# Patient Record
Sex: MALE | Race: WHITE | Employment: FULL TIME | URBAN - METROPOLITAN AREA
[De-identification: names, ages, dates, MRNs, and addresses within clinical notes are randomized per-mention and may not be internally consistent; named-entity substitution may affect disease eponyms.]

---

## 2017-01-22 ENCOUNTER — APPOINTMENT (OUTPATIENT)
Dept: GENERAL RADIOLOGY | Facility: HOSPITAL | Age: 22
End: 2017-01-22
Attending: EMERGENCY MEDICINE
Payer: COMMERCIAL

## 2017-01-22 ENCOUNTER — HOSPITAL ENCOUNTER (EMERGENCY)
Facility: HOSPITAL | Age: 22
Discharge: HOME | End: 2017-01-22
Attending: EMERGENCY MEDICINE
Payer: COMMERCIAL

## 2017-01-22 VITALS
RESPIRATION RATE: 18 BRPM | SYSTOLIC BLOOD PRESSURE: 134 MMHG | HEART RATE: 75 BPM | DIASTOLIC BLOOD PRESSURE: 88 MMHG | TEMPERATURE: 98 F | OXYGEN SATURATION: 100 % | BODY MASS INDEX: 22 KG/M2 | WEIGHT: 155 LBS

## 2017-01-22 DIAGNOSIS — S62.663A CLOSED NONDISPLACED FRACTURE OF DISTAL PHALANX OF LEFT MIDDLE FINGER, INITIAL ENCOUNTER: Primary | ICD-10-CM

## 2017-01-22 PROCEDURE — 11740 EVACUATION SUBUNGUAL HMTMA: CPT

## 2017-01-22 PROCEDURE — 26750 TREAT FINGER FRACTURE EACH: CPT

## 2017-01-22 PROCEDURE — 99283 EMERGENCY DEPT VISIT LOW MDM: CPT

## 2017-01-22 PROCEDURE — 73130 X-RAY EXAM OF HAND: CPT

## 2017-01-22 RX ORDER — CEPHALEXIN 500 MG/1
500 CAPSULE ORAL 4 TIMES DAILY
Qty: 40 CAPSULE | Refills: 0 | Status: SHIPPED | OUTPATIENT
Start: 2017-01-22 | End: 2017-02-01

## 2017-01-22 RX ORDER — HYDROCODONE BITARTRATE AND ACETAMINOPHEN 5; 325 MG/1; MG/1
1-2 TABLET ORAL EVERY 4 HOURS PRN
Qty: 20 TABLET | Refills: 0 | Status: SHIPPED | OUTPATIENT
Start: 2017-01-22 | End: 2017-01-29

## 2017-01-22 NOTE — ED INITIAL ASSESSMENT (HPI)
Patient arrives from home with c/o pain to 3rd, 4th finger and forearm accidentally hit with a sledge hammer approximately 1800 yesterday

## 2017-01-22 NOTE — ED PROVIDER NOTES
Patient Seen in: [facility] Emergency Department    History   Patient presents with:  Upper Extremity Injury (musculoskeletal)    Stated Complaint: Finger injury    HPI    Patient is a 17-year-old male who accidentally hit his left hand sledgehammer Vitals   BP 01/22/17 0222 136/90 mmHg   Pulse 01/22/17 0222 70   Resp 01/22/17 0222 20   Temp 01/22/17 0222 98 °F (36.7 °C)   Temp src 01/22/17 0222 Temporal   SpO2 01/22/17 0222 100 %   O2 Device 01/22/17 0222 None (Room air)       Current:/90 mmHg MD  [address] [address] [phone]    In 3 days  Return to the ER if you feel worse in any way, Return to the ER if you have any concerns      Medications Prescribed:  Current Discharge Medication List    START taking th

## 2018-10-11 PROBLEM — R09.81 NASAL CONGESTION: Status: ACTIVE | Noted: 2018-10-11

## 2019-05-23 PROBLEM — H93.299 ABNORMAL AUDITORY PERCEPTION, UNSPECIFIED LATERALITY: Status: ACTIVE | Noted: 2019-05-23

## 2019-07-31 ENCOUNTER — WALK IN (OUTPATIENT)
Dept: URGENT CARE | Age: 24
End: 2019-07-31

## 2019-07-31 DIAGNOSIS — Z23 NEED FOR DIPHTHERIA-TETANUS-PERTUSSIS (TDAP) VACCINE: ICD-10-CM

## 2019-07-31 DIAGNOSIS — Z23 NEED FOR MMR VACCINE: Primary | ICD-10-CM

## 2019-07-31 PROCEDURE — 90707 MMR VACCINE SC: CPT | Performed by: NURSE PRACTITIONER

## 2019-07-31 PROCEDURE — 90471 IMMUNIZATION ADMIN: CPT | Performed by: NURSE PRACTITIONER

## 2019-07-31 PROCEDURE — 90715 TDAP VACCINE 7 YRS/> IM: CPT | Performed by: NURSE PRACTITIONER

## 2019-07-31 PROCEDURE — 90472 IMMUNIZATION ADMIN EACH ADD: CPT | Performed by: NURSE PRACTITIONER

## 2019-10-11 ENCOUNTER — WALK IN (OUTPATIENT)
Dept: URGENT CARE | Age: 24
End: 2019-10-11

## 2019-10-11 DIAGNOSIS — Z23 NEED FOR IMMUNIZATION AGAINST INFLUENZA: Primary | ICD-10-CM

## 2019-10-11 PROCEDURE — 90686 IIV4 VACC NO PRSV 0.5 ML IM: CPT | Performed by: NURSE PRACTITIONER

## 2019-10-11 PROCEDURE — 90471 IMMUNIZATION ADMIN: CPT | Performed by: NURSE PRACTITIONER

## 2021-07-23 PROBLEM — H69.83 EUSTACHIAN TUBE DYSFUNCTION, BILATERAL: Status: ACTIVE | Noted: 2021-07-23

## 2021-07-23 PROBLEM — H69.93 EUSTACHIAN TUBE DYSFUNCTION, BILATERAL: Status: ACTIVE | Noted: 2021-07-23

## 2025-04-24 ENCOUNTER — TELEPHONE (OUTPATIENT)
Dept: ORTHOPEDICS CLINIC | Facility: CLINIC | Age: 30
End: 2025-04-24

## 2025-04-24 DIAGNOSIS — M25.522 LEFT ELBOW PAIN: Primary | ICD-10-CM

## 2025-04-24 NOTE — TELEPHONE ENCOUNTER
Patient called to schedule with Sincer for the left elbow. Patient stated he has been going to PT and they suggested for him to see Sincer and get xrays. Patient knows to arrive early. Please advise for xray   Future Appointments   Date Time Provider Department Center   4/29/2025  2:40 PM Hood Carney [id number]

## 2025-04-28 ENCOUNTER — OFFICE VISIT (OUTPATIENT)
Dept: ORTHOPEDICS CLINIC | Facility: CLINIC | Age: 30
End: 2025-04-28
Payer: COMMERCIAL

## 2025-04-28 ENCOUNTER — HOSPITAL ENCOUNTER (OUTPATIENT)
Dept: GENERAL RADIOLOGY | Age: 30
Discharge: HOME | End: 2025-04-28
Attending: PHYSICIAN ASSISTANT
Payer: COMMERCIAL

## 2025-04-28 VITALS — WEIGHT: 185 LBS | HEIGHT: 71 IN | BODY MASS INDEX: 25.9 KG/M2

## 2025-04-28 DIAGNOSIS — M25.522 LEFT ELBOW PAIN: ICD-10-CM

## 2025-04-28 DIAGNOSIS — M77.12 LATERAL EPICONDYLITIS OF LEFT ELBOW: Primary | ICD-10-CM

## 2025-04-28 PROCEDURE — 99203 OFFICE O/P NEW LOW 30 MIN: CPT | Performed by: PHYSICIAN ASSISTANT

## 2025-04-28 PROCEDURE — 3008F BODY MASS INDEX DOCD: CPT | Performed by: PHYSICIAN ASSISTANT

## 2025-04-28 PROCEDURE — 73080 X-RAY EXAM OF ELBOW: CPT | Performed by: PHYSICIAN ASSISTANT

## 2025-04-28 RX ORDER — MELOXICAM 15 MG/1
15 TABLET ORAL DAILY
Qty: 30 TABLET | Refills: 0 | Status: SHIPPED | OUTPATIENT
Start: 2025-04-28 | End: 2025-05-28

## 2025-04-28 RX ORDER — METHYLPREDNISOLONE 4 MG/1
TABLET ORAL
Qty: 1 EACH | Refills: 0 | Status: SHIPPED | OUTPATIENT
Start: 2025-04-28

## 2025-04-28 NOTE — H&P
[facility] - ORTHOPEDICS  [address] [place] [address]  [phone]     NEW PATIENT VISIT - HISTORY AND PHYSICAL EXAMINATION     Name: Ashish Velasquez   MRN: [id number]  Date: 2025     CC: Left  elbow pain.    REFERRED BY: KARINA DAUGHERTY    HPI:   Ashish Velasquez is a very pleasant 29 year old right-hand dominant male who presents today for evaluation, consultation, and management of left elbow pain and discomfort with radiation to the left hand/and shoulder.  The patient describes swelling, stiffness, weakness and 3 out of 10 pain.  The pain has been ongoing since 2025 without a clear mechanism of injury. She was referred from [facility].     PMH:   Past Medical History[1]    PAST SURGICAL HX:  Past Surgical History[2]    FAMILY HX:  Family History[3]    ALLERGIES:  Patient has no known allergies.    MEDICATIONS: Current Medications[4]    ROS: A complete 10 point review of systems performed and negative aside from the aforementioned per history of present illness.     SOCIAL HX:  Social History     Occupational History    Not on file   Tobacco Use    Smoking status: Former     Current packs/day: 0.00     Average packs/day: 0.2 packs/day for 2.0 years (0.4 ttl pk-yrs)     Types: Cigarettes     Start date: 2011     Quit date: 2013     Years since quittin.4    Smokeless tobacco: Never    Tobacco comments:     on and off 1 pk per week   Substance and Sexual Activity    Alcohol use: Yes     Alcohol/week: 3.3 standard drinks of alcohol     Types: 2 Glasses of wine, 2 Cans of beer per week     Comment: occasional beer/wine    Drug use: No    Sexual activity: Not on file         PE:   Vitals:    25 1053   Weight: 185 lb (83.9 kg)   Height: 5' 11\" (1.803 m)     Estimated body mass index is 25.8 kg/m² as calculated from the following:    Height as of this encounter: 5' 11\" (1.803 m).    Weight as of this encounter: 185 lb (83.9  kg).    Physical Exam  Constitutional:       Appearance: Normal appearance.   HENT:      Head: Normocephalic and atraumatic.   Eyes:      Extraocular Movements: Extraocular movements intact.   Neck:      Musculoskeletal: Normal range of motion and neck supple.   Cardiovascular:      Pulses: Normal pulses.   Pulmonary:      Effort: Pulmonary effort is normal. No respiratory distress.   Abdominal:      General: There is no distension.   Skin:     General: Skin is warm.      Capillary Refill: Capillary refill takes less than 2 seconds.      Findings: No bruising.   Neurological:      General: No focal deficit present.      Mental Status: Alert.   Psychiatric:         Mood and Affect: Mood normal.     Examination of the left elbow  demonstrates:     Skin is intact, warm and dry.     Deformity:   none  Atrophy:   none      Palpation:     Medial Epicondyle Negative  Lateral Epicondyle Positive  Olecranon   Negative    ROM:   Flexion   full and symmetric  Extension  full and symmetric  Pronation  full and symmetric    Strength:   Supraspinatus:   5/5  Subscapularis:   5/5  Infraspinatus/Teres: 5/5    Provocative Tests:   Moving Valgus Stress Test:  Negative  Biceps Hook Test:   Negative  Tinel's overlying Ulnar Nerve Negative    Neurovascular Upper Extremity (Bilateral)  Motor:    5/5 EPL, Finger Abduction, , Pinch, Deltoid  Sensation:   intact to light touch median, ulnar, radial and axillary nerve  Circulation:   Normal, 2+ radial pulse    The contralateral upper extremity is without limitation in range of motion or strength, no positive provocative maneuvers.     Radiographic Examination/Diagnostics:    I personally viewed, independently interpreted and radiology report was reviewed.    X-rays Left Elbow, 4/28/2024- no evidence of fracture, foreign body or soft tissue injury.     IMPRESSION: Ashish Velasquez is a 29 year old Right hand dominant male left lateral epicondylitis.     PLAN:   We had a detailed  discussion outlining the etiology, anatomy, pathophysiology, and natural history of the patient's findings. Imaging was reviewed in detail and correlated to a 3-dimensional model of the patient's pathology.     We reviewed the treatment of this disease condition.  Fortunately, treatment is amenable to conservative treatment which we chose to optimize at today's visit.  We recommend steroid pack, and meloxicam 15mg. We recommended physical therapy to aid in strengthening, range of motion, functional improvement, and return to baseline activity.  If not improved in four weeks - we will consider The patient had the opportunity to ask questions and all questions were answered appropriately.      FOLLOW-UP:   4 weeks, if not improved steroid injection.           Chasidyr LOLI Carney, [facility], PA-C Orthopedic Surgery / Sports Medicine Specialist  [facility] Orthopaedic Surgery  [address] [place] [address] [id number] [place] [email or web address]  [email or web address]  t: [phone]  o: [phone]  f: [phone]    This note was dictated using Dragon software.  While it was briefly proofread prior to completion, some grammatical, spelling, and word choice errors due to dictation may still occur.           [1]   Past Medical History:   Hearing impairment    conductive hearing loss    PONV (postoperative nausea and vomiting)   [2]   Past Surgical History:  Procedure Laterality Date    Other      orthoscopic bilateral knees 2011 and right knee arthroscopy 2014    Other      ear tubes    Other Right 2011    right knee arthrotomy    Tympanoplas/mastoidectomy Right 12/23/2014   [3]   Family History  Problem Relation Age of Onset    Ear Problems Neg     Allergies Neg     Cancer Neg     Bleeding Disorders Neg     Clotting Disorder Neg     Thyroid disease Neg     Hypertension Neg     Asthma Neg     Arthritis Neg     Diabetes Neg     Anesthesia Problems  Neg    [4]   Current Outpatient Medications   Medication Sig Dispense Refill     methylPREDNISolone (MEDROL) 4 MG Oral Tablet Therapy Pack As directed. 1 each 0    Meloxicam 15 MG Oral Tab Take 1 tablet (15 mg total) by mouth daily. Take 1 tablet by mouth daily for 30 days with food. 30 tablet 0    FLUTICASONE PROPIONATE 50 MCG/ACT Nasal Suspension SHAKE LIQUID AND USE 2 SPRAYS IN EACH NOSTRIL DAILY 16 g 11

## 2025-05-24 DIAGNOSIS — M77.12 LATERAL EPICONDYLITIS OF LEFT ELBOW: ICD-10-CM

## 2025-05-27 RX ORDER — MELOXICAM 15 MG/1
15 TABLET ORAL
Qty: 30 TABLET | Refills: 0 | Status: SHIPPED | OUTPATIENT
Start: 2025-05-27

## 2025-05-27 NOTE — TELEPHONE ENCOUNTER
Meloxicam 15 MG Oral Tab      Medication Quantity Refills Start End   Meloxicam 15 MG Oral Tab 30 tablet 0 4/28/2025 5/28/2025   Sig:   Take 1 tablet (15 mg total) by mouth daily. Take 1 tablet by mouth daily for 30 days with food.     Route:   Oral     Order #:   [id number]         DOS:N/A  Last OV: 04/28/2025  Last refill date: 04/28/2025     #/refills: 30/0  Upcoming appt:   Future Appointments   Date Time Provider Department Center   6/4/2025  7:10 AM [id number]     No recent labs~

## 2025-06-04 ENCOUNTER — OFFICE VISIT (OUTPATIENT)
Dept: ORTHOPEDICS CLINIC | Facility: CLINIC | Age: 30
End: 2025-06-04
Payer: COMMERCIAL

## 2025-06-04 DIAGNOSIS — M24.822 ELBOW LOCKING, LEFT: ICD-10-CM

## 2025-06-04 DIAGNOSIS — M77.12 LATERAL EPICONDYLITIS OF LEFT ELBOW: Primary | ICD-10-CM

## 2025-06-04 PROCEDURE — 99214 OFFICE O/P EST MOD 30 MIN: CPT | Performed by: PHYSICIAN ASSISTANT

## 2025-06-04 NOTE — PROGRESS NOTES
[facility] - ORTHOPEDICS  [address] [place] [address]  [phone]       Name: Ashish Velasquez   MRN: [id number]  Date: 6/4/2025     REASON FOR VISIT: Follow up for left lateral epicondylitis left lateral epicondylitis.      INTERVAL HISTORY:  Ashish Velasquez is a 29 year old male who returns for evaluation of physical therapy, steroid pack and Meloxicam 15mg.  He has noted some improvement but describes some persistent soreness, and describes persistent pain and feels occasionally something is \"jamming\" his elbow 3-4 out of 10 pain.    ROS: ROS    PE:   There were no vitals filed for this visit.  Estimated body mass index is 25.8 kg/m² as calculated from the following:    Height as of 4/28/25: 5' 11\" (1.803 m).    Weight as of 4/28/25: 185 lb (83.9 kg).    Physical Exam  Constitutional:       Appearance: Normal appearance.   HENT:      Head: Normocephalic and atraumatic.   Eyes:      Extraocular Movements: Extraocular movements intact.   Neck:      Musculoskeletal: Normal range of motion and neck supple.   Cardiovascular:      Pulses: Normal pulses.   Pulmonary:      Effort: Pulmonary effort is normal. No respiratory distress.   Abdominal:      General: There is no distension.   Skin:     General: Skin is warm.      Capillary Refill: Capillary refill takes less than 2 seconds.      Findings: No bruising.   Neurological:      General: No focal deficit present.      Mental Status: She is alert.   Psychiatric:         Mood and Affect: Mood normal.       Examination of the left elbow demonstrates:     Skin is intact, warm and dry.   Cervical:  Full ROM  Spurling's  Negative    Deformity:   none  Atrophy:   none    Scapular winging: Negative    Palpation:     AC Joint  Negative  Biceps Tendon  Negative  Greater Tuberosity Negative  +ECRB tendon   +crepitus     ROM:   Forward Flexion:  full and symmetric  Abduction:   full and symmetric  External Rotation:  full and  symmetric  Internal Rotation:  full and symmetric    Rotator Cuff Strength:   Supraspinatus:   5/5  Subscapularis:   5/5  Infraspinatus/Teres: 5/5    Provocative Tests:   Diez:   Negative  Speed's:   Negative  [place]'s:   Negative  Lift-off:   Negative  Apprehension:  Negative  Sulcus Sign:   Negative    Neurovascular Upper Extremity (Bilateral)  Motor:    5/5 EPL, Finger Abduction, , Pinch, Deltoid  Sensation:   intact to light touch median, ulnar, radial and axillary nerve  Circulation:   Normal, 2+ radial pulse    The contralateral upper extremity is without limitation in range of motion or strength, no positive provocative maneuvers.     Radiographic Examination/Diagnostics:    I personally viewed, independently interpreted and radiology report was reviewed.    04/28/2025- Left Elbow:   Impression   CONCLUSION:  No abnormality demonstrated in the left elbow.       IMPRESSION: Ashish Velasquez is a 29 year old male who presented for follow up of left lateral epicondylitis, with concerns for internal deranagement.     PLAN:   We had a detailed discussion outlining the etiology, anatomy, pathophysiology, and natural history of the patient's findings.    We reviewed the treatment of this disease condition.  In light of the chronicity of symptoms, loss of normal function, and  failure to progress conservatively we recommend an MRI to evaluate the integrity of the patient's ECRB tendon and rule out internal deranagement. The patient will follow up after imaging.   Differential diagnosis includes but not limited to: cartilage injury/loose body.    External records were also reviewed for pertinent historical findings contributing to the patients undiagnosed new problem with uncertain prognosis.     The patient had the opportunity to ask questions, and all questions were answered appropriately.         FOLLOW-UP:  Return to clinic following completion of MRI to review scan and findings.             Hood ENNIS  [place]LAND[place] Carney, PAHaydenC Orthopedic Surgery / Sports Medicine Specialist  [facility]edic [facility]  [address] [place] [address]   [email or web address]  [email or web address]  t: [phone]  o: [phone]  f: [phone]    This note was dictated using Dragon software.  While it was briefly proofread prior to completion, some grammatical, spelling, and word choice errors due to dictation may still occur.

## 2025-06-09 ENCOUNTER — HOSPITAL ENCOUNTER (OUTPATIENT)
Dept: MRI IMAGING | Facility: HOSPITAL | Age: 30
Discharge: HOME | End: 2025-06-09
Attending: PHYSICIAN ASSISTANT
Payer: COMMERCIAL

## 2025-06-09 ENCOUNTER — HOSPITAL ENCOUNTER (OUTPATIENT)
Dept: GENERAL RADIOLOGY | Facility: HOSPITAL | Age: 30
Discharge: HOME | End: 2025-06-09
Attending: PHYSICIAN ASSISTANT
Payer: COMMERCIAL

## 2025-06-09 DIAGNOSIS — M24.822 ELBOW LOCKING, LEFT: ICD-10-CM

## 2025-06-09 DIAGNOSIS — M77.12 LATERAL EPICONDYLITIS OF LEFT ELBOW: ICD-10-CM

## 2025-06-09 PROCEDURE — 73221 MRI JOINT UPR EXTREM W/O DYE: CPT | Performed by: PHYSICIAN ASSISTANT

## 2025-06-09 PROCEDURE — 70140 X-RAY EXAM OF FACIAL BONES: CPT | Performed by: PHYSICIAN ASSISTANT

## 2025-06-24 ENCOUNTER — OFFICE VISIT (OUTPATIENT)
Dept: ORTHOPEDICS CLINIC | Facility: CLINIC | Age: 30
End: 2025-06-24
Payer: COMMERCIAL

## 2025-06-24 DIAGNOSIS — M79.9 SOFT TISSUE LESION OF ELBOW REGION: Primary | ICD-10-CM

## 2025-06-24 DIAGNOSIS — M77.12 LATERAL EPICONDYLITIS OF LEFT ELBOW: ICD-10-CM

## 2025-06-24 PROCEDURE — 20606 DRAIN/INJ JOINT/BURSA W/US: CPT | Performed by: PHYSICIAN ASSISTANT

## 2025-06-24 PROCEDURE — 99213 OFFICE O/P EST LOW 20 MIN: CPT | Performed by: PHYSICIAN ASSISTANT

## 2025-06-24 RX ORDER — TRIAMCINOLONE ACETONIDE 40 MG/ML
40 INJECTION, SUSPENSION INTRA-ARTICULAR; INTRAMUSCULAR ONCE
Status: COMPLETED | OUTPATIENT
Start: 2025-06-24 | End: 2025-06-24

## 2025-06-24 RX ORDER — KETOROLAC TROMETHAMINE 30 MG/ML
30 INJECTION, SOLUTION INTRAMUSCULAR; INTRAVENOUS ONCE
Status: COMPLETED | OUTPATIENT
Start: 2025-06-24 | End: 2025-06-24

## 2025-06-24 RX ADMIN — KETOROLAC TROMETHAMINE 30 MG: 30 INJECTION, SOLUTION INTRAMUSCULAR; INTRAVENOUS at 07:08:00

## 2025-06-24 RX ADMIN — TRIAMCINOLONE ACETONIDE 40 MG: 40 INJECTION, SUSPENSION INTRA-ARTICULAR; INTRAMUSCULAR at 07:08:00

## 2025-06-24 NOTE — PROGRESS NOTES
[facility] - ORTHOPEDICS  [address] [place] [address]  [phone]       Name: Ashish Velasquez   MRN: [id number]  Date: 6/24/2025     REASON FOR VISIT: Follow up for  left lateral epicondylitis.      INTERVAL HISTORY:  Ashish Velasquez is a 29 year old male who returns for evaluation of  left lateral epicondylitis.  The patient presented for recurrent left lateral epicondylitis.  He has attempted physical therapy, steroid pack and meloxicam.  Recently he complained of something occasionally jamming his elbow and was sent for an MRI to evaluate for internal derangement.  He presents today for follow-up.    ROS: ROS    PE:   There were no vitals filed for this visit.  Estimated body mass index is 25.8 kg/m² as calculated from the following:    Height as of 4/28/25: 5' 11\" (1.803 m).    Weight as of 4/28/25: 185 lb (83.9 kg).    Physical Exam  Constitutional:       Appearance: Normal appearance.   HENT:      Head: Normocephalic and atraumatic.   Eyes:      Extraocular Movements: Extraocular movements intact.   Neck:      Musculoskeletal: Normal range of motion and neck supple.   Cardiovascular:      Pulses: Normal pulses.   Pulmonary:      Effort: Pulmonary effort is normal. No respiratory distress.   Abdominal:      General: There is no distension.   Skin:     General: Skin is warm.      Capillary Refill: Capillary refill takes less than 2 seconds.      Findings: No bruising.   Neurological:      General: No focal deficit present.      Mental Status: She is alert.   Psychiatric:         Mood and Affect: Mood normal.       Examination of the left elbow demonstrates:      Skin is intact, warm and dry.   Cervical:  Full ROM  Spurling's                           Negative     Deformity:                         none  Atrophy:                             none    Scapular winging:Negative     Palpation:                            AC Joint                              Negative  Biceps  Tendon                  Negative  Greater Tuberosity          Negative  +ECRB tendon   +crepitus      ROM:   Forward Flexion:              full and symmetric  Abduction:                         full and symmetric  External Rotation:           full and symmetric  Internal Rotation:            full and symmetric     Rotator Cuff Strength:   Supraspinatus:                  5/5  Subscapularis:                   5/5  Infraspinatus/Teres:        5/5     Provocative Tests:   Diez:                            Negative  Speed's:                             Negative  [place]'s:                           Negative  Lift-off:                  Negative  Apprehension:                  Negative  Sulcus Sign:                        Negative     Neurovascular Upper Extremity (Bilateral)  Motor:                                5/5 EPL, Finger Abduction, , Pinch, Deltoid  Sensation:                          intact to light touch median, ulnar, radial and axillary nerve  Circulation:                        Normal, 2+ radial pulse     The contralateral upper extremity is without limitation in range of motion or strength, no positive provocative maneuvers.     Radiographic Examination/Diagnostics:    I personally viewed, independently interpreted and radiology report was reviewed.    MRI ELBOW, LEFT (CPT=73221)  Result Date: 6/12/2025  PROCEDURE: MRI ELBOW, LEFT (CPT=73221)  COMPARISON: SHELBY Amanda ELBOW, COMPLETE (MIN 3 VIEWS), LEFT (CPT=73080), 4/28/2025, 10:42 AM.  INDICATIONS: M24.822 Elbow locking, left M77.12 Lateral epicondylitis of left elbow  TECHNIQUE: A complete multi-planar examination was performed without contrast.   FINDINGS:  TENDONS EXTENSORS: There is thickening with increased signal seen within the proximal aspect of the common extensor tendon at its origin.  There are small clefts of fluid undercutting the tendon bone interface at the level of the lateral epicondyle.  No full-thickness or retracted tear.  FLEXORS: Normal.  No tear, tendinopathy, or bone marrow edema at the origin or insertion.  OTHER: Normal biceps, brachialis, and triceps tendons.   LIGAMENTS RADIAL: Normal.  No tear of the radial collateral, lateral ulnar collateral, or annular ligaments.  ULNAR: Normal.  No tear or bone marrow edema adjacent to the origin or insertion.   MUSCLES: Within the distal musculotendinous junction of the brachialis, there is a multiloculated and septated lesion measuring 1 x 0.7 by 2.8 cm.  This lesion is hyperintense on the proton density fat suppressed sequences and is hyperintense to the adjacent skeletal muscle on the T1 weighted images.  Remainder of the muscles otherwise have a normal signal intensity and bulk. BONES: Normal.  No edema, AVN, fracture, or significant arthropathy.  EFFUSION: None.  No synovitis or loose bodies.  CUBITAL TUNNEL: Normal.  Normal caliber and signal intensity of the ulnar nerve.  OTHER: Negative.          CONCLUSION:   Tendinosis of the common extensor tendon with partial thickness tearing (lateral epicondylitis).  No full-thickness or retracted tear.  2.8 cm cystic lesion within the brachialis muscle is most suggestive of an intramuscular ganglion cyst.  However on the T1 weighted sequences, the lesion is slightly complex which maybe secondary to internal debris, blood byproducts or soft tissue. A post contrast MRI of the elbow or dedicated targeted ultrasound is recommended to exclude underlying enhancement/vascularity.    Dictated by (CST): Fredi Taylor MD on 6/12/2025 at 3:10 PM     Finalized by (CST): Fredi Taylor MD on 6/12/2025 at 3:37 PM          XR ORBITS SCREENING R/O FB MRI EM  Result Date: 6/9/2025  PROCEDURE: XR ORBITS SCREENING R/O FB MRI EM  COMPARISON: None.  INDICATIONS: Recent metallic injury to eye, evaluate for foreign body.  TECHNIQUE:   Sotelo view.   FINDINGS:  ORBITS: Negative for metallic foreign body. OTHER: Negative.         CONCLUSION: No radiodense foreign  body.   Dictated by (CST): Scot Santana MD on 6/09/2025 at 11:30 AM     Finalized by (CST): Scot Santana MD on 6/09/2025 at 11:32 AM            IMPRESSION: Ashish Velasquez is a 29 year old male who presented for follow up of left elbow lateral epicondylitis, partial tearing of the extensor tendon.    PLAN:   We had a detailed discussion outlining the etiology, anatomy, pathophysiology, and natural history of the patient's findings.    We reviewed the treatment of this disease condition.  Fortunately, treatment is amenable to conservative treatment which we chose to optimize at today's visit.     After a discussion of a variety of conservative treatment options we elected to proceed with the injection procedure at today's visit. We discussed the risk and benefits of the procedure, including, but not limited to: infection, injury to blood vessels, nerve injury, prolonged pain, swelling, site soreness, failure to progress, and need for advanced treatments.  The patient voiced understanding and agreed to proceed with the treatment plan.     His MRI findings did demonstrate a cystic lesion that radiographically was suggestive of an intramuscular ganglion cyst.  We will obtain a postcontrast study of the elbow to further evaluate this as recommended by radiology.     We recommended physical therapy to aid in strengthening, range of motion, functional improvement, and return to baseline activity.  The patient had opportunity to ask questions and all questions were answered appropriately.    FOLLOW-UP:  Return to clinic following completion of MRI to review scan and findings.             Hood Carney [facility], PA-C Orthopedic Surgery / Sports Medicine Specialist  [facility] Orthopaedic Surgery  [address] [place] [address] [id number] [place] [email or web address]  [email or web address]  t: [phone]  o: [phone]  f: [phone]    This note was dictated using Dragon software.  While it was briefly proofread prior to completion,  some grammatical, spelling, and word choice errors due to dictation may still occur.

## 2025-06-24 NOTE — PROCEDURES
Left Elbow Joint Injection    Name: Ashish Velasquez   MRN: [id number]  Date: 6/24/2025     Clinical Indications:   Lateral Epicondylitis    After informed consent, the injection site was marked, sterilized with topical chlorhexidine antiseptic, and locally anesthetized with skin refrigerant.    The patient was seated upright and the shoulder was exposed. Using sterile technique: 1 mL of 30mg/mL of Ketorolac, 1 mL of 0.5% Bupivicaine, 1 mL of 1% Lidocaine, and 1 mL of 40mg/mL Triamcinolone was injected with direct approach utilizing a 25 gauge needle.  A band-aid was applied.  The patient tolerated the procedure well.    Disposition:   Continue with physical/occupational therapy and follow up in clinic if symptoms do not resolve in 8 weeks.           Hood Carney [facility], PA-C Orthopedic Surgery / Sports Medicine Specialist  [facility]  [address] [place], [place] [email or web address]  [email or web address]  t: [phone]  o: [phone]  f: [phone]    This note was dictated using Dragon software.  While it was briefly proofread prior to completion, some grammatical, spelling, and word choice errors due to dictation may still occur.

## 2025-07-11 ENCOUNTER — HOSPITAL ENCOUNTER (OUTPATIENT)
Dept: MRI IMAGING | Facility: HOSPITAL | Age: 30
Discharge: HOME | End: 2025-07-11
Attending: PHYSICIAN ASSISTANT
Payer: COMMERCIAL

## 2025-07-11 DIAGNOSIS — M77.12 LATERAL EPICONDYLITIS OF LEFT ELBOW: ICD-10-CM

## 2025-07-11 DIAGNOSIS — M79.9 SOFT TISSUE LESION OF ELBOW REGION: ICD-10-CM

## 2025-07-11 PROCEDURE — 73223 MRI JOINT UPR EXTR W/O&W/DYE: CPT | Performed by: PHYSICIAN ASSISTANT

## 2025-07-11 PROCEDURE — A9575 INJ GADOTERATE MEGLUMI 0.1ML: HCPCS | Performed by: PHYSICIAN ASSISTANT

## 2025-07-11 RX ORDER — GADOTERATE MEGLUMINE 376.9 MG/ML
18 INJECTION INTRAVENOUS
Status: COMPLETED | OUTPATIENT
Start: 2025-07-11 | End: 2025-07-11

## 2025-07-11 RX ADMIN — GADOTERATE MEGLUMINE 18 ML: 376.9 INJECTION INTRAVENOUS at 21:30:00

## 2025-08-05 DIAGNOSIS — M77.12 LATERAL EPICONDYLITIS OF LEFT ELBOW: ICD-10-CM

## 2025-08-05 RX ORDER — MELOXICAM 15 MG/1
15 TABLET ORAL
Qty: 30 TABLET | Refills: 0 | Status: SHIPPED | OUTPATIENT
Start: 2025-08-05

## 2025-08-06 ENCOUNTER — TELEPHONE (OUTPATIENT)
Dept: ORTHOPEDICS CLINIC | Facility: CLINIC | Age: 30
End: 2025-08-06

## (undated) NOTE — ED AVS SNAPSHOT
[facility] Emergency Department    [facility]  [address] [address]    Phone:  [phone]    Fax:  [phone]           Mr. iKmberly Morales   MRN: [id number]    Department:  [facility] Emergency Department   Date of Visi IF THERE IS ANY CHANGE OR WORSENING OF YOUR CONDITION, CALL YOUR PRIMARY CARE PHYSICIAN AT ONCE OR RETURN IMMEDIATELY TO THE EMERGENCY DEPARTMENT.     If you have been prescribed any medication(s), please fill your prescription right away and begin taking t

## (undated) NOTE — ED AVS SNAPSHOT
[facility] Emergency Department    [facility]  [address] [address]    Phone:  [phone]    Fax:  [phone]            Hank Malone   MRN: [id number]    Department:  [facility] Emergency Department   Date of Visi coverage for follow-up care and referrals. [address] [facility] [place] [phone]  Pediatric 443 3314 Emergency Department   [phone]       To Check ER Wait Times:  TEXT 'Dorinda' to [phone]      Click [email or web address] will be contacted. Please make sure we have your correct phone number before you leave. After you leave, you should follow the attached instructions. I have read and understand the instructions given to me by my caregivers.         24-Hour Pharmacies XR HAND (MIN 3 VIEWS), LEFT (CPT=73130) (In process)       RETChart     Sign up for [employer], your secure online medical record. RETChart will allow you to access patient instructions from your recent visit,  view other health information, and more.  To sign